# Patient Record
Sex: FEMALE | Race: WHITE | Employment: FULL TIME | URBAN - METROPOLITAN AREA
[De-identification: names, ages, dates, MRNs, and addresses within clinical notes are randomized per-mention and may not be internally consistent; named-entity substitution may affect disease eponyms.]

---

## 2019-04-10 ENCOUNTER — HOSPITAL ENCOUNTER (EMERGENCY)
Age: 54
Discharge: HOME | End: 2019-04-11
Attending: EMERGENCY MEDICINE | Admitting: EMERGENCY MEDICINE
Payer: COMMERCIAL

## 2019-04-10 ENCOUNTER — APPOINTMENT (OUTPATIENT)
Dept: GENERAL RADIOLOGY | Age: 54
End: 2019-04-10
Attending: EMERGENCY MEDICINE
Payer: COMMERCIAL

## 2019-04-10 DIAGNOSIS — R10.84 COLICKY ABDOMINAL PAIN: Primary | ICD-10-CM

## 2019-04-10 DIAGNOSIS — R11.2 NAUSEA VOMITING AND DIARRHEA: ICD-10-CM

## 2019-04-10 DIAGNOSIS — N39.0 ACUTE UTI: ICD-10-CM

## 2019-04-10 DIAGNOSIS — R19.7 NAUSEA VOMITING AND DIARRHEA: ICD-10-CM

## 2019-04-10 LAB
ALBUMIN SERPL-MCNC: 4.6 G/DL (ref 3.4–5)
ALBUMIN/GLOB SERPL: 1.1 {RATIO} (ref 0.8–1.7)
ALP SERPL-CCNC: 76 U/L (ref 45–117)
ALT SERPL-CCNC: 31 U/L (ref 13–56)
ANION GAP SERPL CALC-SCNC: 7 MMOL/L (ref 3–18)
APPEARANCE UR: ABNORMAL
AST SERPL-CCNC: 17 U/L (ref 15–37)
BACTERIA URNS QL MICRO: ABNORMAL /HPF
BASOPHILS # BLD: 0 K/UL (ref 0–0.1)
BASOPHILS NFR BLD: 0 % (ref 0–2)
BILIRUB SERPL-MCNC: 0.5 MG/DL (ref 0.2–1)
BILIRUB UR QL: ABNORMAL
BUN SERPL-MCNC: 17 MG/DL (ref 7–18)
BUN/CREAT SERPL: 14 (ref 12–20)
CALCIUM SERPL-MCNC: 9.2 MG/DL (ref 8.5–10.1)
CHLORIDE SERPL-SCNC: 103 MMOL/L (ref 100–108)
CK MB CFR SERPL CALC: NORMAL % (ref 0–4)
CK MB SERPL-MCNC: <1 NG/ML (ref 5–25)
CK SERPL-CCNC: 107 U/L (ref 26–192)
CO2 SERPL-SCNC: 28 MMOL/L (ref 21–32)
COLOR UR: ABNORMAL
CREAT SERPL-MCNC: 1.18 MG/DL (ref 0.6–1.3)
DIFFERENTIAL METHOD BLD: ABNORMAL
EOSINOPHIL # BLD: 0 K/UL (ref 0–0.4)
EOSINOPHIL NFR BLD: 0 % (ref 0–5)
EPITH CASTS URNS QL MICRO: ABNORMAL /LPF (ref 0–5)
ERYTHROCYTE [DISTWIDTH] IN BLOOD BY AUTOMATED COUNT: 12.3 % (ref 11.6–14.5)
GLOBULIN SER CALC-MCNC: 4.3 G/DL (ref 2–4)
GLUCOSE SERPL-MCNC: 132 MG/DL (ref 74–99)
GLUCOSE UR STRIP.AUTO-MCNC: NEGATIVE MG/DL
HCT VFR BLD AUTO: 38.5 % (ref 35–45)
HGB BLD-MCNC: 12.8 G/DL (ref 12–16)
HGB UR QL STRIP: NEGATIVE
KETONES UR QL STRIP.AUTO: ABNORMAL MG/DL
LEUKOCYTE ESTERASE UR QL STRIP.AUTO: ABNORMAL
LYMPHOCYTES # BLD: 0.6 K/UL (ref 0.9–3.6)
LYMPHOCYTES NFR BLD: 5 % (ref 21–52)
MAGNESIUM SERPL-MCNC: 1.9 MG/DL (ref 1.6–2.6)
MCH RBC QN AUTO: 29.8 PG (ref 24–34)
MCHC RBC AUTO-ENTMCNC: 33.2 G/DL (ref 31–37)
MCV RBC AUTO: 89.7 FL (ref 74–97)
MONOCYTES # BLD: 0.1 K/UL (ref 0.05–1.2)
MONOCYTES NFR BLD: 1 % (ref 3–10)
MUCOUS THREADS URNS QL MICRO: ABNORMAL /LPF
NEUTS SEG # BLD: 10.7 K/UL (ref 1.8–8)
NEUTS SEG NFR BLD: 94 % (ref 40–73)
NITRITE UR QL STRIP.AUTO: NEGATIVE
PH UR STRIP: 5 [PH] (ref 5–8)
PLATELET # BLD AUTO: 282 K/UL (ref 135–420)
PMV BLD AUTO: 11.1 FL (ref 9.2–11.8)
POTASSIUM SERPL-SCNC: 4.1 MMOL/L (ref 3.5–5.5)
PROT SERPL-MCNC: 8.9 G/DL (ref 6.4–8.2)
PROT UR STRIP-MCNC: 30 MG/DL
RBC # BLD AUTO: 4.29 M/UL (ref 4.2–5.3)
RBC #/AREA URNS HPF: NEGATIVE /HPF (ref 0–5)
SODIUM SERPL-SCNC: 138 MMOL/L (ref 136–145)
SP GR UR REFRACTOMETRY: >1.03 (ref 1–1.03)
TROPONIN I SERPL-MCNC: <0.02 NG/ML (ref 0–0.04)
UROBILINOGEN UR QL STRIP.AUTO: 1 EU/DL (ref 0.2–1)
WBC # BLD AUTO: 11.4 K/UL (ref 4.6–13.2)
WBC URNS QL MICRO: ABNORMAL /HPF (ref 0–5)

## 2019-04-10 PROCEDURE — 83735 ASSAY OF MAGNESIUM: CPT

## 2019-04-10 PROCEDURE — 85025 COMPLETE CBC W/AUTO DIFF WBC: CPT

## 2019-04-10 PROCEDURE — 96374 THER/PROPH/DIAG INJ IV PUSH: CPT

## 2019-04-10 PROCEDURE — 80053 COMPREHEN METABOLIC PANEL: CPT

## 2019-04-10 PROCEDURE — 81001 URINALYSIS AUTO W/SCOPE: CPT

## 2019-04-10 PROCEDURE — 87086 URINE CULTURE/COLONY COUNT: CPT

## 2019-04-10 PROCEDURE — 74011250636 HC RX REV CODE- 250/636: Performed by: EMERGENCY MEDICINE

## 2019-04-10 PROCEDURE — 82550 ASSAY OF CK (CPK): CPT

## 2019-04-10 PROCEDURE — 74011250637 HC RX REV CODE- 250/637: Performed by: EMERGENCY MEDICINE

## 2019-04-10 PROCEDURE — 96361 HYDRATE IV INFUSION ADD-ON: CPT

## 2019-04-10 PROCEDURE — 99284 EMERGENCY DEPT VISIT MOD MDM: CPT

## 2019-04-10 PROCEDURE — 74022 RADEX COMPL AQT ABD SERIES: CPT

## 2019-04-10 PROCEDURE — 96375 TX/PRO/DX INJ NEW DRUG ADDON: CPT

## 2019-04-10 RX ORDER — KETOROLAC TROMETHAMINE 30 MG/ML
30 INJECTION, SOLUTION INTRAMUSCULAR; INTRAVENOUS
Status: COMPLETED | OUTPATIENT
Start: 2019-04-10 | End: 2019-04-10

## 2019-04-10 RX ORDER — FAMOTIDINE 10 MG/ML
20 INJECTION INTRAVENOUS
Status: COMPLETED | OUTPATIENT
Start: 2019-04-10 | End: 2019-04-10

## 2019-04-10 RX ORDER — DICYCLOMINE HYDROCHLORIDE 10 MG/1
10 CAPSULE ORAL 4 TIMES DAILY
Status: DISCONTINUED | OUTPATIENT
Start: 2019-04-10 | End: 2019-04-11 | Stop reason: HOSPADM

## 2019-04-10 RX ORDER — SODIUM CHLORIDE 0.9 % (FLUSH) 0.9 %
5-40 SYRINGE (ML) INJECTION AS NEEDED
Status: DISCONTINUED | OUTPATIENT
Start: 2019-04-10 | End: 2019-04-11 | Stop reason: HOSPADM

## 2019-04-10 RX ORDER — SODIUM CHLORIDE 0.9 % (FLUSH) 0.9 %
5-40 SYRINGE (ML) INJECTION EVERY 8 HOURS
Status: DISCONTINUED | OUTPATIENT
Start: 2019-04-10 | End: 2019-04-11 | Stop reason: HOSPADM

## 2019-04-10 RX ORDER — ONDANSETRON 2 MG/ML
4 INJECTION INTRAMUSCULAR; INTRAVENOUS
Status: COMPLETED | OUTPATIENT
Start: 2019-04-10 | End: 2019-04-10

## 2019-04-10 RX ADMIN — Medication 10 ML: at 23:31

## 2019-04-10 RX ADMIN — FAMOTIDINE 20 MG: 10 INJECTION, SOLUTION INTRAVENOUS at 23:31

## 2019-04-10 RX ADMIN — SODIUM CHLORIDE 1000 ML: 900 INJECTION, SOLUTION INTRAVENOUS at 22:50

## 2019-04-10 RX ADMIN — KETOROLAC TROMETHAMINE 30 MG: 30 INJECTION, SOLUTION INTRAMUSCULAR at 23:31

## 2019-04-10 RX ADMIN — DICYCLOMINE HYDROCHLORIDE 10 MG: 10 CAPSULE ORAL at 23:31

## 2019-04-10 RX ADMIN — ONDANSETRON 4 MG: 2 INJECTION INTRAMUSCULAR; INTRAVENOUS at 23:31

## 2019-04-10 NOTE — LETTER
NOTIFICATION RETURN TO WORK / SCHOOL 
 
4/11/2019 12:19 AM 
 
Ms. [employer] Doswell 
[address] [place] [address] To Whom It May Concern: 
 
Hunter Evans is currently under the care of THE [facility] EMERGENCY DEPT. She will return to work/school on: 15 April, 2019 If there are questions or concerns please have the patient contact our office.  
 
 
 
Sincerely, 
 
 
 
Claudio Smith MD

## 2019-04-11 VITALS
RESPIRATION RATE: 18 BRPM | DIASTOLIC BLOOD PRESSURE: 68 MMHG | BODY MASS INDEX: 25.71 KG/M2 | HEIGHT: 66 IN | TEMPERATURE: 97.6 F | WEIGHT: 160 LBS | HEART RATE: 94 BPM | OXYGEN SATURATION: 99 % | SYSTOLIC BLOOD PRESSURE: 110 MMHG

## 2019-04-11 PROCEDURE — 74011250637 HC RX REV CODE- 250/637: Performed by: EMERGENCY MEDICINE

## 2019-04-11 RX ORDER — ONDANSETRON 4 MG/1
4 TABLET, FILM COATED ORAL
Qty: 12 TAB | Refills: 0 | Status: SHIPPED | OUTPATIENT
Start: 2019-04-11

## 2019-04-11 RX ORDER — SULFAMETHOXAZOLE AND TRIMETHOPRIM 800; 160 MG/1; MG/1
1 TABLET ORAL 2 TIMES DAILY
Qty: 14 TAB | Refills: 0 | Status: SHIPPED | OUTPATIENT
Start: 2019-04-11 | End: 2019-04-18

## 2019-04-11 RX ORDER — SULFAMETHOXAZOLE AND TRIMETHOPRIM 800; 160 MG/1; MG/1
1 TABLET ORAL
Status: COMPLETED | OUTPATIENT
Start: 2019-04-11 | End: 2019-04-11

## 2019-04-11 RX ORDER — DICYCLOMINE HYDROCHLORIDE 20 MG/1
20 TABLET ORAL EVERY 6 HOURS
Qty: 20 TAB | Refills: 0 | Status: SHIPPED | OUTPATIENT
Start: 2019-04-11 | End: 2019-04-17

## 2019-04-11 NOTE — ED TRIAGE NOTES
Patient is ambulatory to triage and c/o nausea vomiting and diarrhea since 1530. Patient c/o abdominal pain 6/10.

## 2019-04-11 NOTE — DISCHARGE INSTRUCTIONS
Patient Education        Urinary Tract Infection in Women: Care Instructions  Your Care Instructions    A urinary tract infection, or UTI, is a general term for an infection anywhere between the kidneys and the urethra (where urine comes out). Most UTIs are bladder infections. They often cause pain or burning when you urinate. UTIs are caused by bacteria and can be cured with antibiotics. Be sure to complete your treatment so that the infection goes away. Follow-up care is a key part of your treatment and safety. Be sure to make and go to all appointments, and call your doctor if you are having problems. It's also a good idea to know your test results and keep a list of the medicines you take. How can you care for yourself at home? · Take your antibiotics as directed. Do not stop taking them just because you feel better. You need to take the full course of antibiotics. · Drink extra water and other fluids for the next day or two. This may help wash out the bacteria that are causing the infection. (If you have kidney, heart, or liver disease and have to limit fluids, talk with your doctor before you increase your fluid intake.)  · Avoid drinks that are carbonated or have caffeine. They can irritate the bladder. · Urinate often. Try to empty your bladder each time. · To relieve pain, take a hot bath or lay a heating pad set on low over your lower belly or genital area. Never go to sleep with a heating pad in place. To prevent UTIs  · Drink plenty of water each day. This helps you urinate often, which clears bacteria from your system. (If you have kidney, heart, or liver disease and have to limit fluids, talk with your doctor before you increase your fluid intake.)  · Urinate when you need to. · Urinate right after you have sex. · Change sanitary pads often. · Avoid douches, bubble baths, feminine hygiene sprays, and other feminine hygiene products that have deodorants.   · After going to the bathroom, wipe from front to back. When should you call for help? Call your doctor now or seek immediate medical care if:    · Symptoms such as fever, chills, nausea, or vomiting get worse or appear for the first time.     · You have new pain in your back just below your rib cage. This is called flank pain.     · There is new blood or pus in your urine.     · You have any problems with your antibiotic medicine.    Watch closely for changes in your health, and be sure to contact your doctor if:    · You are not getting better after taking an antibiotic for 2 days.     · Your symptoms go away but then come back. Where can you learn more? Go to http://gale-ratna.info/. Enter V560 in the search box to learn more about \"Urinary Tract Infection in Women: Care Instructions. \"  Current as of: March 20, 2018  Content Version: 11.9  © 5447-1984 [employer]. Care instructions adapted under license by [employer] (which disclaims liability or warranty for this information). If you have questions about a medical condition or this instruction, always ask your healthcare professional. [place] [address] any warranty or liability for your use of this information. Patient Education        Traveler's Diarrhea: Care Instructions  Your Care Instructions    Traveler's diarrhea is loose, watery bowel movements you can get when you travel. It also can cause vomiting and belly cramps. This kind of diarrhea is usually caused by bacteria. But sometimes it is caused by a parasite or virus. Most people get it when they eat undercooked, raw, or contaminated foods. You can also get it if you drink contaminated water or if you drink something that has contaminated ice cubes in it. In some cases, new foods can cause diarrhea. In other cases, the stress and anxiety of travel can cause it. Traveler's diarrhea usually isn't serious.  Most of the time, bowel movements return to normal quickly. The most important thing is to prevent dehydration. Make sure to drink a lot of fluids. Follow-up care is a key part of your treatment and safety. Be sure to make and go to all appointments, and call your doctor if you are having problems. It's also a good idea to know your test results and keep a list of the medicines you take. How can you care for yourself at home? · Watch for signs of dehydration. This means your body has lost too much water. Dehydration is serious and needs to be treated right away. Signs of dehydration are:  ? Feeling more thirsty than usual.  ? Dry eyes and mouth. ? Feeling faint or lightheaded. ? Darker urine, and a smaller amount of urine than normal.  · To prevent dehydration, drink plenty of fluids, enough so that your urine is light yellow or clear like water. Choose water and other caffeine-free clear liquids until you feel better. If you have kidney, heart, or liver disease and have to limit fluids, talk with your doctor before you increase the amount of fluids you drink. · Start to eat small amounts of mild foods the next day, if you feel like it. ? Avoid spicy foods, fruits, alcohol, and caffeine until 48 hours after all symptoms go away. ? Avoid chewing gum that has sorbitol. ? Try yogurt that has live cultures of Lactobacillus. You can check the label for this. Avoid other dairy products while you have diarrhea and for 3 days after symptoms go away. · Your doctor may recommend an over-the-counter medicine. These may include bismuth subsalicylate (Pepto-Bismol) or loperamide (Imodium). Read and follow all instructions on the label. Do not use these medicines if your doctor does not recommend them. · Be safe with medicines. If your doctor recommends prescription medicine, take it as prescribed. Call your doctor if you think you are having a problem with your medicine. You will get more details on the specific medicines your doctor prescribes.   · If your doctor prescribes antibiotics, take them as directed. Do not stop taking them just because you feel better. You need to take the full course of antibiotics. When should you call for help? Call 911 anytime you think you may need emergency care. For example, call if:    · You passed out (lost consciousness).     · Your stools are maroon or very bloody.    Call your doctor now or seek immediate medical care if:    · You are dizzy or lightheaded, or you feel like you may faint.     · Your stools are black and look like tar, or they have streaks of blood.     · You have diarrhea and your belly pain or cramps are worse.     · You have signs of needing more fluids. You have sunken eyes, a dry mouth, and pass only a little dark urine.    Watch closely for changes in your health, and be sure to contact your doctor if:    · You have 12 or more loose stools in 24 hours.     · You see pus in the diarrhea.     · You have a new or higher fever.     · Your diarrhea does not get better or is more frequent. Where can you learn more? Go to http://gale-ratna.info/. Enter L368 in the search box to learn more about \"Traveler's Diarrhea: Care Instructions. \"  Current as of: March 28, 2018  Content Version: 11.9  © 1142-9952 [employer]. Care instructions adapted under license by [employer] (which disclaims liability or warranty for this information). If you have questions about a medical condition or this instruction, always ask your healthcare professional. [place] [address] any warranty or liability for your use of this information.

## 2019-04-12 LAB
BACTERIA SPEC CULT: NORMAL
SERVICE CMNT-IMP: NORMAL

## 2019-05-01 NOTE — ED PROVIDER NOTES
EMERGENCY DEPARTMENT HISTORY AND PHYSICAL EXAM 
 
Date: 4/10/2019 Patient Name: Stu Boyce History of Presenting Illness Chief Complaint Patient presents with  Vomiting  Diarrhea History Provided By: patient Additional History (Context):  
9:19 PM 
Stu Boyce is a 47 y.o. female with PMHX of  who presents to the emergency department C/O crampy achy abdominal pain started roughly 5 hours prior to arrival.  Pain is generalized diffuse nonfocal does not radiate does not have increasing or worsening factors aside from the vomiting. Pain vomiting and diarrhea all seem to start roughly at the same time. There are no fevers or chills no chest pain there is urine no urinary frequency urgency or dysuria. She denies any leftovers or fast food indulgences recently. She denies any known ill contacts. No prior episodes of frequent abdominal pain. No other complaints Soc hx Neg for Tob, EtOH, rxs Fam hx 
negative PCP: Joao Wray MD 
 
Current Outpatient Medications Medication Sig Dispense Refill  ondansetron hcl (ZOFRAN) 4 mg tablet Take 1 Tab by mouth every eight (8) hours as needed for Nausea. 12 Tab 0  
 multivitamin, tx-iron-ca-min (THERA-M W/ IRON) 9 mg iron-400 mcg tab tablet Take 1 Tab by mouth daily.  cetirizine (ZYRTEC) 10 mg tablet Take 10 mg by mouth daily.  cyanocobalamin (VITAMIN B-12) 1,000 mcg tablet Take 1,000 mcg by mouth daily.  garlic 3,822 mcg Tab Take 1 Tab by mouth daily. Past History Past Medical History: 
History reviewed. No pertinent past medical history. Past Surgical History: 
Past Surgical History:  
Procedure Laterality Date  HX HYSTERECTOMY    
 LEMUEL Family History: 
Family History Problem Relation Age of Onset  Malignant Hyperthermia Neg Hx  Pseudocholinesterase Deficiency Neg Hx  Post-op Nausea/Vomiting Neg Hx  Delayed Awakening Neg Hx  Emergence Delirium Neg Hx  Post-op Cognitive Dysfunction Neg Hx   
 Other Neg Hx Social History: 
Social History Tobacco Use  Smoking status: Never Smoker  Smokeless tobacco: Never Used Substance Use Topics  Alcohol use: No  
 Drug use: No  
 
 
Allergies: Allergies Allergen Reactions  Augmentin [Amoxicillin-Pot Clavulanate] Nausea and Vomiting  Other Plant, Animal, Environmental Other (comments)  
  pollen Review of Systems Review of Systems Constitutional: Negative for chills, diaphoresis, fever and unexpected weight change. HENT: Negative for congestion, drooling, ear pain, rhinorrhea, sore throat, tinnitus and trouble swallowing. Eyes: Negative for photophobia, pain, redness and visual disturbance. Respiratory: Negative for cough, choking, chest tightness, shortness of breath, wheezing and stridor. Cardiovascular: Negative for chest pain, palpitations and leg swelling. Gastrointestinal: Positive for abdominal pain, diarrhea, nausea and vomiting. Negative for abdominal distention, anal bleeding, blood in stool, constipation and rectal pain. Endocrine: Negative for cold intolerance, heat intolerance, polydipsia and polyuria. Genitourinary: Positive for frequency and pelvic pain. Negative for difficulty urinating, dysuria, flank pain, hematuria, urgency and vaginal bleeding. Musculoskeletal: Negative for arthralgias, back pain and neck pain. Skin: Negative for color change, rash and wound. Allergic/Immunologic: Negative for immunocompromised state. Neurological: Negative for dizziness, seizures, syncope, speech difficulty, light-headedness and headaches. Hematological: Does not bruise/bleed easily. Psychiatric/Behavioral: Negative for agitation, behavioral problems, hallucinations, self-injury and suicidal ideas. The patient is not hyperactive. Physical Exam  
 
Vitals:  
 04/10/19 2057 04/11/19 0118 BP: 118/71 110/68 Pulse: (!) 113 94 Resp: 18 18 Temp: 97.6 °F (36.4 °C) SpO2: 100% 99% Weight: 72.6 kg (160 lb) Height: 5' 6\" (1.676 m) Physical Exam  
Constitutional: She is oriented to person, place, and time. She appears well-developed and well-nourished. No distress. HENT:  
Head: Normocephalic and atraumatic. Right Ear: External ear normal.  
Left Ear: External ear normal.  
Mouth/Throat: Oropharynx is clear and moist. No oropharyngeal exudate. Eyes: Pupils are equal, round, and reactive to light. Conjunctivae and EOM are normal. No scleral icterus. No pallor Neck: Normal range of motion. Neck supple. No JVD present. No tracheal deviation present. No thyromegaly present. Cardiovascular: Normal rate, regular rhythm and normal heart sounds. Pulmonary/Chest: Effort normal and breath sounds normal. No stridor. No respiratory distress. Abdominal: Soft. Bowel sounds are normal. She exhibits no distension. There is no tenderness. There is no rebound and no guarding. Musculoskeletal: Normal range of motion. She exhibits no edema or tenderness. No soft tissue injuries Lymphadenopathy:  
  She has no cervical adenopathy. Neurological: She is alert and oriented to person, place, and time. She has normal reflexes. No cranial nerve deficit. Coordination normal.  
Skin: Skin is warm and dry. No rash noted. She is not diaphoretic. No erythema. Psychiatric: She has a normal mood and affect. Her behavior is normal. Judgment and thought content normal.  
Nursing note and vitals reviewed. Diagnostic Study Results Labs - Lab Results  
 
   
Contains abnormal data URINALYSIS W/ RFLX MICROSCOPIC (Final result)  
 Component (Lab Inquiry) Collection Time Result Time COLOR APPRN SPGRU DEANGELO PROTU GLUCU KETU BILU BLDU UROU  
04/10/19 22:33:00 04/10/19 22:58:14 DARK YELLOW TURBID >1.030High  5.0 30Abnormal  NEGATIVE  TRACEAbnormal  SMALL 
(NOTE) Positive, unab. Johanna Riser Johanna Riser Abnormal  NEGATIVE  1.0  
   
Collection Time Result Time Cherry Ting 04/10/19 22:33:00 04/10/19 22:58:14 NEGATIVE  SMALLAbnormal   
   
Final result  
   
  
  
  
   
   
Contains abnormal data URINE MICROSCOPIC ONLY (Final result)  
 Component (Lab Inquiry) Collection Time Result Time WBCU RBCU EPSU BACTU MUCUS  
04/10/19 22:33:00 04/10/19 22:58:14 15 to 20 NEGATIVE  1+ 1+Abnormal  3+Abnormal   
   
Final result  
   
  
  
  
   
   
CULTURE, URINE (Final result)  
 Component (Lab Inquiry) Collection Time Result Time SREQ CULT  
04/10/19 22:33:00 04/12/19 10:20:18 NO SPECIAL REQUESTS >100,000 COLONIES/mL MIXED GRAM POSITIVE ANNI, PROBABLE SKIN/GENITAL CONTAMINATION.    
Final result  
   
  
  
  
   
 
   
Contains abnormal data CBC WITH AUTOMATED DIFF (Final result)  
 Component (Lab Inquiry) Collection Time Result Time WBC RBC HGB HCT MCV MCH MCHC RDW PLT MPLV  
04/10/19 21:50:00 04/10/19 22:59:23 11.4 4.29 12.8 38.5 89.7 29.8 33.2 12.3 282 11.1  
   
Collection Time Result Time GRANS LYMPH MONOS EOS BASOS ABG ABL ABM SIOBHAN ABB  
04/10/19 21:50:00 04/10/19 22:59:23 94High  5Low  1Low  0 0 10. 7High  0.6Low  0.1 0.0 0.0  
   
Collection Time Result Time DF  
04/10/19 21:50:00 04/10/19 22:59:23 AUTOMATED  
   
Final result  
   
  
  
  
   
   
Contains abnormal data METABOLIC PANEL, COMPREHENSIVE (Final result)  
 Component (Lab Inquiry) Collection Time Result Time NA K CL CO2 AGAP GLU BUN CREA BUCR GFRAA  
04/10/19 21:50:00 04/10/19 23:11:17 138 4.1 103 28 7 132High  17 1.18 14 58Low   
   
Collection Time Result Time GFRNA CA TBIL GPT SGOT AP TP ALB GLOB AGRAT  
04/10/19 21:50:00 04/10/19 23:11:17 48 
(NOTE) Estimated GFR . [place] Low  9.2 0.5 31 17 76 8.9High  4.6 4.3High  1.1  
   
Final result  
   
  
  
  
   
   
MAGNESIUM (Final result)  
 Component (Lab Inquiry) Collection Time Result Time MG  
04/10/19 21:50:00 04/10/19 23:11:17 1.9  
 
   
Final result  
   
  
  
  
   
 
   
CARDIAC PANEL,(CK, CKMB & TROPONIN) (Final result)  Component (Lab Inquiry) Collection Time Result Time CPK CKRMB CKND1 TROQR  
04/10/19 21:50:00 04/10/19 23:11:17 107 <1.0 CALCULATION NOT PERFORMED WHEN RESULT IS BELOW LINEAR LIMIT <0.02 The presence of detect. ..  
 
   
Final result  
   
  
  
  
   
 
 
 
Radiologic Studies -  
 
XR ABD ACUTE W 1 V CHEST Final Result IMPRESSION:  
  
No bowel obstruction or other acute findings. CT Results  (Last 48 hours) None CXR Results  (Last 48 hours) None Medications given in the ED- Medications  
sodium chloride 0.9 % bolus infusion 1,000 mL (0 mL IntraVENous IV Completed 4/11/19 0004) ondansetron [facility]) injection 4 mg (4 mg IntraVENous Given 4/10/19 2331) famotidine (PF) (PEPCID) injection 20 mg (20 mg IntraVENous Given 4/10/19 2331)  
ketorolac (TORADOL) injection 30 mg (30 mg IntraVENous Given 4/10/19 2331)  
trimethoprim-sulfamethoxazole (BACTRIM DS, SEPTRA DS) 160-800 mg per tablet 1 Tab (1 Tab Oral Dispensed at Discharge 4/11/19 0018) Medical Decision Making I am the first provider for this patient. I reviewed the vital signs, available nursing notes, past medical history, past surgical history, family history and social history. Vital Signs-Reviewed the patient's vital signs. Pulse Oximetry Analysis - 99% on room air Records Reviewed: NURSING NOTES AND PREVIOUS MEDICAL RECORDS Provider Notes (Medical Decision Making): Symptoms strongly suggest gastroenteritis-like findings. Her abdomen is completely nontender during presentation therefore no CAT scan was ordered plain x-ray was used to screen for intra-abdominal process including bowel obstruction or other services like pneumonia. No evidence of effusion or free air or other process. Urine was positive for UTI and antibiotics started and culture sent. GI symptoms are likely viral in etiology.   No stool sample was collected as none was available during her ED presentation. Outpatient follow-up recommended Procedures: 
Procedures ED Course:  
9:19 PM: Initial assessment performed. The patients presenting problems have been discussed, and they are in agreement with the care plan formulated and outlined with them. I have encouraged them to ask questions as they arise throughout their visit. Diagnosis and Disposition DISCHARGE NOTE: 
 
Paz Reddy  results have been reviewed with her. She has been counseled regarding her diagnosis, treatment, and plan. She verbally conveys understanding and agreement of the signs, symptoms, diagnosis, treatment and prognosis and additionally agrees to follow up as discussed. She also agrees with the care-plan and conveys that all of her questions have been answered. I have also provided discharge instructions for her that include: educational information regarding their diagnosis and treatment, and list of reasons why they would want to return to the ED prior to their follow-up appointment, should her condition change. She has been provided with education for proper emergency department utilization. CLINICAL IMPRESSION: 
 
1. Colicky abdominal pain 2. Nausea vomiting and diarrhea 3. Acute UTI PLAN: 
1. D/C Home 2. Discharge Medication List as of 4/11/2019 12:17 AM  
  
START taking these medications Details  
dicyclomine (BENTYL) 20 mg tablet Take 1 Tab by mouth every six (6) hours for 20 doses. , Normal, Disp-20 Tab, R-0  
  
trimethoprim-sulfamethoxazole (BACTRIM DS) 160-800 mg per tablet Take 1 Tab by mouth two (2) times a day for 7 days. , Normal, Disp-14 Tab, R-0  
  
ondansetron hcl (ZOFRAN) 4 mg tablet Take 1 Tab by mouth every eight (8) hours as needed for Nausea., Normal, Disp-12 Tab, R-0  
  
  
CONTINUE these medications which have NOT CHANGED Details  
multivitamin, tx-iron-ca-min (THERA-M W/ IRON) 9 mg iron-400 mcg tab tablet Take 1 Tab by mouth daily. , Historical Med  
  
 cetirizine (ZYRTEC) 10 mg tablet Take 10 mg by mouth daily. , Historical Med  
  
cyanocobalamin (VITAMIN B-12) 1,000 mcg tablet Take 1,000 mcg by mouth daily. , Historical Med  
  
garlic 0,314 mcg Tab Take 1 Tab by mouth daily. , Historical Med 3. Follow-up Information Follow up With Specialties Details Why Contact Info Joao Wray MD Family Practice In 1 week  [address] [place] [id number] [phone] As needed, If symptoms worsen   
  
 
_______________________________ This note was partially transcribed via voice recognition software. Although efforts have been made to catch any discrepancies, it may contain sound alike words, grammatical errors, or nonsensical words.